# Patient Record
Sex: FEMALE | Race: BLACK OR AFRICAN AMERICAN | NOT HISPANIC OR LATINO | URBAN - METROPOLITAN AREA
[De-identification: names, ages, dates, MRNs, and addresses within clinical notes are randomized per-mention and may not be internally consistent; named-entity substitution may affect disease eponyms.]

---

## 2017-01-13 ENCOUNTER — COMMUNICATION - HEALTHEAST (OUTPATIENT)
Dept: HEALTH INFORMATION MANAGEMENT | Facility: CLINIC | Age: 27
End: 2017-01-13

## 2018-04-03 ENCOUNTER — OFFICE VISIT - HEALTHEAST (OUTPATIENT)
Dept: FAMILY MEDICINE | Facility: CLINIC | Age: 28
End: 2018-04-03

## 2018-04-03 DIAGNOSIS — Z00.00 ROUTINE ADULT HEALTH MAINTENANCE: ICD-10-CM

## 2018-04-03 DIAGNOSIS — N92.0 EXCESSIVE OR FREQUENT MENSTRUATION: ICD-10-CM

## 2018-04-03 ASSESSMENT — MIFFLIN-ST. JEOR: SCORE: 1376.33

## 2018-04-04 LAB
C TRACH DNA SPEC QL PROBE+SIG AMP: NEGATIVE
N GONORRHOEA DNA SPEC QL NAA+PROBE: NEGATIVE

## 2018-04-06 ENCOUNTER — COMMUNICATION - HEALTHEAST (OUTPATIENT)
Dept: FAMILY MEDICINE | Facility: CLINIC | Age: 28
End: 2018-04-06

## 2018-04-06 LAB
BKR LAB AP ABNORMAL BLEEDING: YES
BKR LAB AP BIRTH CONTROL/HORMONES: ABNORMAL
BKR LAB AP CERVICAL APPEARANCE: NORMAL
BKR LAB AP GYN ADEQUACY: ABNORMAL
BKR LAB AP GYN INTERPRETATION: ABNORMAL
BKR LAB AP HPV REFLEX: ABNORMAL
BKR LAB AP LMP: ABNORMAL
BKR LAB AP PATIENT STATUS: ABNORMAL
BKR LAB AP PREVIOUS ABNORMAL: ABNORMAL
BKR LAB AP PREVIOUS NORMAL: 2015
HIGH RISK?: NO
PATH REPORT.COMMENTS IMP SPEC: ABNORMAL
RESULT FLAG (HE HISTORICAL CONVERSION): ABNORMAL

## 2018-04-10 ENCOUNTER — HOSPITAL ENCOUNTER (OUTPATIENT)
Dept: ULTRASOUND IMAGING | Facility: HOSPITAL | Age: 28
Discharge: HOME | End: 2018-04-10
Attending: FAMILY MEDICINE

## 2018-04-10 ENCOUNTER — AMBULATORY - HEALTHEAST (OUTPATIENT)
Dept: FAMILY MEDICINE | Facility: CLINIC | Age: 28
End: 2018-04-10

## 2018-04-10 ENCOUNTER — COMMUNICATION - HEALTHEAST (OUTPATIENT)
Dept: FAMILY MEDICINE | Facility: CLINIC | Age: 28
End: 2018-04-10

## 2018-04-10 DIAGNOSIS — N92.0 EXCESSIVE OR FREQUENT MENSTRUATION: ICD-10-CM

## 2018-04-10 DIAGNOSIS — N83.202 LEFT OVARIAN CYST: ICD-10-CM

## 2018-04-13 ENCOUNTER — COMMUNICATION - HEALTHEAST (OUTPATIENT)
Dept: FAMILY MEDICINE | Facility: CLINIC | Age: 28
End: 2018-04-13

## 2018-05-03 ENCOUNTER — OFFICE VISIT - HEALTHEAST (OUTPATIENT)
Dept: FAMILY MEDICINE | Facility: CLINIC | Age: 28
End: 2018-05-03

## 2018-05-03 DIAGNOSIS — M54.50 ACUTE BILATERAL LOW BACK PAIN WITHOUT SCIATICA: ICD-10-CM

## 2018-05-03 DIAGNOSIS — R87.619 ATYPICAL ENDOCERVICAL CELLS ON PAP SMEAR: ICD-10-CM

## 2018-05-03 DIAGNOSIS — R10.30 LOWER ABDOMINAL PAIN: ICD-10-CM

## 2018-05-03 DIAGNOSIS — N92.0 SPOTTING: ICD-10-CM

## 2018-05-03 ASSESSMENT — MIFFLIN-ST. JEOR: SCORE: 1376.33

## 2018-06-07 ENCOUNTER — AMBULATORY - HEALTHEAST (OUTPATIENT)
Dept: FAMILY MEDICINE | Facility: CLINIC | Age: 28
End: 2018-06-07

## 2018-06-07 DIAGNOSIS — R07.89 ATYPICAL CHEST PAIN: ICD-10-CM

## 2018-06-07 DIAGNOSIS — R87.619 ABNORMAL PAP SMEAR OF CERVIX: ICD-10-CM

## 2018-06-07 LAB
HCG UR QL: NEGATIVE
SP GR UR STRIP: 1.02 (ref 1–1.03)

## 2018-06-11 LAB
LAB AP CHARGES (HE HISTORICAL CONVERSION): NORMAL
PATH REPORT.COMMENTS IMP SPEC: NORMAL
PATH REPORT.COMMENTS IMP SPEC: NORMAL
PATH REPORT.FINAL DX SPEC: NORMAL
PATH REPORT.GROSS SPEC: NORMAL
PATH REPORT.MICROSCOPIC SPEC OTHER STN: NORMAL
PATH REPORT.RELEVANT HX SPEC: NORMAL
RESULT FLAG (HE HISTORICAL CONVERSION): NORMAL

## 2018-06-14 ENCOUNTER — COMMUNICATION - HEALTHEAST (OUTPATIENT)
Dept: SCHEDULING | Facility: CLINIC | Age: 28
End: 2018-06-14

## 2018-06-15 ENCOUNTER — OFFICE VISIT - HEALTHEAST (OUTPATIENT)
Dept: FAMILY MEDICINE | Facility: CLINIC | Age: 28
End: 2018-06-15

## 2018-06-15 DIAGNOSIS — M94.0 COSTOCHONDRITIS: ICD-10-CM

## 2018-06-15 DIAGNOSIS — R07.89 CHEST WALL PAIN: ICD-10-CM

## 2018-07-23 ENCOUNTER — OFFICE VISIT - HEALTHEAST (OUTPATIENT)
Dept: FAMILY MEDICINE | Facility: CLINIC | Age: 28
End: 2018-07-23

## 2018-07-23 DIAGNOSIS — L91.8 SKIN TAG: ICD-10-CM

## 2018-07-23 DIAGNOSIS — N92.0 EXCESSIVE OR FREQUENT MENSTRUATION: ICD-10-CM

## 2018-08-02 ENCOUNTER — AMBULATORY - HEALTHEAST (OUTPATIENT)
Dept: FAMILY MEDICINE | Facility: CLINIC | Age: 28
End: 2018-08-02

## 2018-08-02 DIAGNOSIS — Z30.09 BIRTH CONTROL COUNSELING: ICD-10-CM

## 2018-08-02 DIAGNOSIS — Z30.46 ENCOUNTER FOR NEXPLANON REMOVAL: ICD-10-CM

## 2018-10-18 ENCOUNTER — AMBULATORY - HEALTHEAST (OUTPATIENT)
Dept: NURSING | Facility: CLINIC | Age: 28
End: 2018-10-18

## 2019-01-18 ENCOUNTER — AMBULATORY - HEALTHEAST (OUTPATIENT)
Dept: NURSING | Facility: CLINIC | Age: 29
End: 2019-01-18

## 2019-01-18 DIAGNOSIS — Z00.00 HEALTHCARE MAINTENANCE: ICD-10-CM

## 2019-04-12 ENCOUNTER — AMBULATORY - HEALTHEAST (OUTPATIENT)
Dept: NURSING | Facility: CLINIC | Age: 29
End: 2019-04-12

## 2019-07-11 ENCOUNTER — COMMUNICATION - HEALTHEAST (OUTPATIENT)
Dept: FAMILY MEDICINE | Facility: CLINIC | Age: 29
End: 2019-07-11

## 2019-07-11 ENCOUNTER — AMBULATORY - HEALTHEAST (OUTPATIENT)
Dept: FAMILY MEDICINE | Facility: CLINIC | Age: 29
End: 2019-07-11

## 2019-07-11 ENCOUNTER — AMBULATORY - HEALTHEAST (OUTPATIENT)
Dept: NURSING | Facility: CLINIC | Age: 29
End: 2019-07-11

## 2019-09-23 ENCOUNTER — OFFICE VISIT - HEALTHEAST (OUTPATIENT)
Dept: FAMILY MEDICINE | Facility: CLINIC | Age: 29
End: 2019-09-23

## 2019-09-23 DIAGNOSIS — F43.22 ADJUSTMENT DISORDER WITH ANXIOUS MOOD: ICD-10-CM

## 2019-09-23 LAB
ERYTHROCYTE [DISTWIDTH] IN BLOOD BY AUTOMATED COUNT: 11.6 % (ref 11–14.5)
HCT VFR BLD AUTO: 38.4 % (ref 35–47)
HGB BLD-MCNC: 13.2 G/DL (ref 12–16)
MCH RBC QN AUTO: 29.4 PG (ref 27–34)
MCHC RBC AUTO-ENTMCNC: 34.2 G/DL (ref 32–36)
MCV RBC AUTO: 86 FL (ref 80–100)
PLATELET # BLD AUTO: 277 THOU/UL (ref 140–440)
PMV BLD AUTO: 7.7 FL (ref 7–10)
RBC # BLD AUTO: 4.47 MILL/UL (ref 3.8–5.4)
TSH SERPL DL<=0.005 MIU/L-ACNC: 1.28 UIU/ML (ref 0.3–5)
WBC: 4.3 THOU/UL (ref 4–11)

## 2019-09-23 RX ORDER — CITALOPRAM HYDROBROMIDE 10 MG/1
10 TABLET ORAL DAILY
Qty: 30 TABLET | Refills: 2 | Status: SHIPPED | OUTPATIENT
Start: 2019-09-23

## 2019-09-23 RX ORDER — ALPRAZOLAM 0.25 MG
TABLET ORAL
Qty: 15 TABLET | Refills: 0 | Status: SHIPPED | OUTPATIENT
Start: 2019-09-23

## 2019-10-03 ENCOUNTER — COMMUNICATION - HEALTHEAST (OUTPATIENT)
Dept: FAMILY MEDICINE | Facility: CLINIC | Age: 29
End: 2019-10-03

## 2019-10-03 DIAGNOSIS — Z30.09 BIRTH CONTROL COUNSELING: ICD-10-CM

## 2020-11-10 ENCOUNTER — COMMUNICATION - HEALTHEAST (OUTPATIENT)
Dept: SCHEDULING | Facility: CLINIC | Age: 30
End: 2020-11-10

## 2020-11-11 ENCOUNTER — OFFICE VISIT - HEALTHEAST (OUTPATIENT)
Dept: FAMILY MEDICINE | Facility: CLINIC | Age: 30
End: 2020-11-11

## 2020-11-11 DIAGNOSIS — U07.1 2019 NOVEL CORONAVIRUS DISEASE (COVID-19): ICD-10-CM

## 2021-06-01 VITALS — WEIGHT: 158.3 LBS | BODY MASS INDEX: 28.95 KG/M2

## 2021-06-01 VITALS — HEIGHT: 62 IN | WEIGHT: 155 LBS | BODY MASS INDEX: 28.52 KG/M2

## 2021-06-01 VITALS — WEIGHT: 155 LBS | HEIGHT: 62 IN | BODY MASS INDEX: 28.52 KG/M2

## 2021-06-01 VITALS — WEIGHT: 159 LBS | BODY MASS INDEX: 29.08 KG/M2

## 2021-06-01 VITALS — BODY MASS INDEX: 28.84 KG/M2 | WEIGHT: 157.7 LBS

## 2021-06-01 VITALS — BODY MASS INDEX: 28.9 KG/M2 | WEIGHT: 158 LBS

## 2021-06-01 NOTE — PROGRESS NOTES
ASSESSMENT/PLAN:  1. Adjustment disorder with anxious mood  28 yo female with adjustment disorder and anxiety due to relationship stress.  She is started on citalopram. We discussed side effects of citalopram.  Given alprazolam to be taken sparingly for symptoms of more severe anxiety.  She is referred for individual counseling.  Follow up in 3 weeks  - citalopram (CELEXA) 10 MG tablet; Take 1 tablet (10 mg total) by mouth daily.  Dispense: 30 tablet; Refill: 2  - ALPRAZolam (XANAX) 0.25 MG tablet; Take 1 tab po daily prn as needed for anxiet  Dispense: 15 tablet; Refill: 0  - HM2(CBC w/o Differential)  - Thyroid Archuleta  - Ambulatory referral to Psychology      Dragon dictation was used for this note.  Speech recognition errors are a possibility.    No follow-ups on file.  There are no Patient Instructions on file for this visit.    Orders Placed This Encounter   Procedures     Influenza,Seasonal,Quad,INJ =/>6months     HM2(CBC w/o Differential)     Thyroid Archuleta     Ambulatory referral to Psychology     Referral Priority:   Routine     Referral Type:   Behavioral Health     Referral Reason:   Evaluation and Treatment     Requested Specialty:   Psychology     Number of Visits Requested:   1     There are no discontinued medications.      CHIEF COMPLAINT;  Chief Complaint   Patient presents with     Stress       HISTORY OF PRESENT ILLNESS:  Serafin is a 29 y.o. female presenting to the clinic today for concerns for anxiety and stress. She and her significant other Tolbert are have difficulties. This very hard for her. She is tearful daily, it is affecting her sleep and she is very anxious throughout the day. 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 She does not drink alcohol and is a nonsmoker. No history of similar symptoms or treatment for depression.  No thoughts of hurting herself.    Remainder of 12-point ROS is negative.    TOBACCO USE:  Social History     Tobacco Use   Smoking Status  Never Smoker   Smokeless Tobacco Never Used       VITALS:  Vitals:    09/23/19 1034   BP: 100/70   Patient Site: Left Arm   Patient Position: Sitting   Cuff Size: Adult Regular   Pulse: 66   SpO2: 99%   Weight: 154 lb 14.4 oz (70.3 kg)     Wt Readings from Last 3 Encounters:   09/23/19 154 lb 14.4 oz (70.3 kg)   08/02/18 158 lb 4.8 oz (71.8 kg)   07/23/18 159 lb (72.1 kg)     Body mass index is 28.33 kg/m .    PHYSICAL EXAM:  GENERAL APPEARANCE: Alert, cooperative, no distress, appears stated age  She is tearful but has appropriate affect and is well groomed  RECENT RESULTS  No results found for this or any previous visit (from the past 48 hour(s)).    MEDICATIONS:  Current Outpatient Medications   Medication Sig Dispense Refill     ALPRAZolam (XANAX) 0.25 MG tablet Take 1 tab po daily prn as needed for anxiet 15 tablet 0     citalopram (CELEXA) 10 MG tablet Take 1 tablet (10 mg total) by mouth daily. 30 tablet 2     No current facility-administered medications for this visit.

## 2021-06-03 VITALS
OXYGEN SATURATION: 99 % | DIASTOLIC BLOOD PRESSURE: 70 MMHG | WEIGHT: 154.9 LBS | HEART RATE: 66 BPM | BODY MASS INDEX: 28.33 KG/M2 | SYSTOLIC BLOOD PRESSURE: 100 MMHG

## 2021-06-13 NOTE — PROGRESS NOTES
"Serafin Ramos is a 30 y.o. female who is being evaluated via a billable telephone visit.      The patient has been notified of following:     \"This telephone visit will be conducted via a call between you and your physician/provider. We have found that certain health care needs can be provided without the need for a physical exam.  This service lets us provide the care you need with a short phone conversation.  If a prescription is necessary we can send it directly to your pharmacy.  If lab work is needed we can place an order for that and you can then stop by our lab to have the test done at a later time.    Telephone visits are billed at different rates depending on your insurance coverage. During this emergency period, for some insurers they may be billed the same as an in-person visit.  Please reach out to your insurance provider with any questions.    If during the course of the call the physician/provider feels a telephone visit is not appropriate, you will not be charged for this service.\"    Patient has given verbal consent to a Telephone visit? Yes    What phone number would you like to be contacted at? [phone]    Patient would like to receive their AVS by AVS Preference: Yvonne.        Assessment/Plan:     1. 2019 novel coronavirus disease (COVID-19)         Diagnoses and all orders for this visit:    2019 novel coronavirus disease (COVID-19)    Discussed with patient that retesting is not necessary.  She is having symptoms consistent with COVID-19 infection including cough, change in taste, headache, body aches,  fatigue as well as subjective fever and chills.  She had a positive COVID-19 test at work.  At this point, recommend that she self isolate per CDC guidelines for 10 days after onset of symptoms and until she has been fever free for 24 hours and has had improvement in symptoms.  Discussed that treatment is symptomatic.  Encouraged rest and fluids.  She should contact clinic if she develops " difficulty breathing, shortness of breath or new concerning symptoms.             Subjective:      Serafin Ramos is a 30 y.o. female who is evaluated today via telephone encounter to discuss testing for Covid.  She works at the [employer].  She reports that she had a Covid test performed yesterday.  States that they were testing all employees.  She is not sure if there was an exposure.  She reports her test was positive.  She was advised to discuss with her primary clinic whether she should be retested.  Patient reports that she is having symptoms consistent with COVID-19.  States that she has had a cough since , which is about 4 days.  She has not had a rash.  No rhinorrhea.  No sore throat.  She has had headache.  She has felt warm and cold at times.  States things taste bitter in her mouth when she eats.  She has had some body aches and fatigue.  She has had some nausea but no diarrhea.  Appetite has been normal.  She has not had shortness of breath.  We reviewed and updated her medications.  Review of systems is otherwise negative.  No other questions today.    Current Outpatient Medications   Medication Sig Dispense Refill     ALPRAZolam (XANAX) 0.25 MG tablet Take 1 tab po daily prn as needed for anxiet 15 tablet 0     citalopram (CELEXA) 10 MG tablet Take 1 tablet (10 mg total) by mouth daily. 30 tablet 2     Current Facility-Administered Medications   Medication Dose Route Frequency Provider Last Rate Last Dose     medroxyPROGESTERone injection 150 mg (DEPO-PROVERA)  150 mg Intramuscular Q3 Months Lissette Momin MD           Past Medical History, Family History, and Social History reviewed.  Past Medical History:   Diagnosis Date     Nexplanon insertion 2016    OUt 2019     Past Surgical History:   Procedure Laterality Date      SECTION, LOW TRANSVERSE       Patient has no known allergies.  Family History   Problem Relation Age of Onset     Breast cancer Neg Hx      Clotting  disorder Neg Hx      Stroke Neg Hx      Social History     Socioeconomic History     Marital status: Single     Spouse name: Not on file     Number of children: Not on file     Years of education: Not on file     Highest education level: Not on file   Occupational History     Not on file   Social Needs     Financial resource strain: Not on file     Food insecurity     Worry: Not on file     Inability: Not on file     Transportation needs     Medical: Not on file     Non-medical: Not on file   Tobacco Use     Smoking status: Never Smoker     Smokeless tobacco: Never Used   Substance and Sexual Activity     Alcohol use: No     Drug use: No     Sexual activity: Not on file   Lifestyle     Physical activity     Days per week: Not on file     Minutes per session: Not on file     Stress: Not on file   Relationships     Social connections     Talks on phone: Not on file     Gets together: Not on file     Attends Denominational service: Not on file     Active member of club or organization: Not on file     Attends meetings of clubs or organizations: Not on file     Relationship status: Not on file     Intimate partner violence     Fear of current or ex partner: Not on file     Emotionally abused: Not on file     Physically abused: Not on file     Forced sexual activity: Not on file   Other Topics Concern     Not on file   Social History Narrative     Not on file         Review of systems is as stated in HPI, and the remainder of the 10 system review is otherwise negative.    Objective:     There were no vitals filed for this visit. There is no height or weight on file to calculate BMI.    Exam: she is alert. She is speaking clearly. She does not sound short of breath    This note has been dictated using voice recognition software. Any grammatical or context distortions are unintentional and inherent to the the software.             Phone call duration:  7 minutes    Loni Parra MD

## 2021-06-13 NOTE — TELEPHONE ENCOUNTER
Patient states she was just tested for Covid 19 at her employer today and results were positive and employer told her to be re-tested through provider.  Patient has no symptoms.  Transferred to scheduling for Virtual Visit.    Laquita Beasley RN  [facility] Triage Nurse Advisor

## 2021-06-16 PROBLEM — N83.202 LEFT OVARIAN CYST: Status: ACTIVE | Noted: 2018-04-10

## 2021-06-17 NOTE — PROGRESS NOTES
ASSESSMENT/PLAN:  1. Routine adult health maintenance  Healthy 26 yo female.  Pap smear updated.  Encouraged healthy eating and exercise.  - Gynecologic Cytology (PAP Smear)    2. Menorrhagia  Nexplanon in place, bleeding likely a side effect- ultrasound for evaluation and consider adding OCP to stabilize bleeding  - Chlamydia trachomatis & Neisseria gonorrhoeae, Amplified Detection  - US Pelvis With Transvaginal Non OB; Future      General Health Maintenance  Her PAP smear was completed today.    There are no Patient Instructions on file for this visit.    Orders Placed This Encounter   Procedures     Chlamydia trachomatis & Neisseria gonorrhoeae, Amplified Detection     Order Specific Question:   Specimen Source?     Answer:   Endocervix     US Pelvis With Transvaginal Non OB     Standing Status:   Future     Standing Expiration Date:   4/3/2019     Order Specific Question:   Reason for Exam (Describe Symptoms):     Answer:   menorrhagia, right adenexa fullness and tenderness     Order Specific Question:   Is the patient pregnant?     Answer:   No     Order Specific Question:   Can the procedure be changed per Radiologist protocol?     Answer:   Yes     There are no discontinued medications.    No Follow-up on file.    CHIEF COMPLAINT:  Chief Complaint   Patient presents with     Annual Exam       HISTORY OF PRESENT ILLNESS:  Serafin is a 27 y.o. female presenting to the clinic today for an annual exam.    Menorrhagia: She had the nexplanon implanted in clinic 7/28/16. Since then, she has been experiencing heavy bleeding and abdominal pain. The heavy bleeding will last for 4-5 days. Her abdominal pain started about a month ago. She feels her symptoms are consistent with her Nexplanon. She has previously received Depo Provera injections and did not experience adverse side effects. She does not want an IUD. She would go back to receiving Depo Provera shots, but after her current Nexplanon expires. She would like to  "take oral contraceptive in conjunction with the Nexplanon.     REVIEW OF SYSTEMS:   All other systems are negative.    PFSH:  She denies a family history of breast cancer, blood clots, or early strokes. Reviewed, as below.    History   Smoking Status     Never Smoker   Smokeless Tobacco     Never Used       Family History   Problem Relation Age of Onset     Breast cancer Neg Hx      Clotting disorder Neg Hx      Stroke Neg Hx        Social History     Social History     Marital status: Single     Spouse name: N/A     Number of children: N/A     Years of education: N/A     Occupational History     Not on file.     Social History Main Topics     Smoking status: Never Smoker     Smokeless tobacco: Never Used     Alcohol use No     Drug use: No     Sexual activity: Not on file     Other Topics Concern     Not on file     Social History Narrative       Past Surgical History:   Procedure Laterality Date      SECTION, LOW TRANSVERSE         No Known Allergies    Active Ambulatory Problems     Diagnosis Date Noted     Menorrhagia      Resolved Ambulatory Problems     Diagnosis Date Noted     Vaginal Discharge      Pelvic Pain      Pain During Urination (Dysuria)      Constipation      Amenorrhea      Abdominal pain      Pregnancy Complicated By Benign Essential Hypertension - Delivered With A Postpartum Complication      Rhythm Disorder      Pregnancy      Pregnant 10/02/2015     , delivered 10/03/2015     Past Medical History:   Diagnosis Date     Nexplanon insertion 2016       VITALS:  Vitals:    18 1353   BP: 100/64   Pulse: 62   Resp: 18   Weight: 155 lb (70.3 kg)   Height: 5' 2\" (1.575 m)     Wt Readings from Last 3 Encounters:   18 155 lb (70.3 kg)   10/15/17 157 lb (71.2 kg)   16 156 lb (70.8 kg)     Body mass index is 28.35 kg/(m^2).    PHYSICAL EXAM:  General Appearance: Alert, pleasant, appears stated age  Head: Normocephalic, without obvious abnormality  Eyes: PERRL, " conjunctiva/corneas clear, EOM's intact  Ears: Normal TM's and external ear canals, both ears  Nose: Nares normal, septum midline,mucosa normal, no drainage  Throat: Lips, mucosa, and tongue normal; teeth and gums normal; oropharynx is clear  Neck: Supple,without lymphadenopathy or thyromegaly  Lungs: Clear to auscultation bilaterally, respirations unlabored  Breasts: No palpable masses, tenderness, asymmetry, or nipple discharge. No axillary or supraclavicular lymphadenopathy  Heart: Regular rate and rhythm, no murmur   Abdomen: Soft, non-tender, no masses, no organomegaly  Pelvic: Normally developed genitalia with no external lesions or eruptions. Vagina and cervix show no lesions. No cystocele, No rectocele. Uterus normal.  Cervical motion tenderness. Adnexal fullness and tenderness.  Extremities: Extremities with strong and symmetric pulses, no cyanosis or edema  Skin: Skin color, texture normal, no rashes or lesions  Neuro: Normal    RECENT RESULTS  No results found for this or any previous visit (from the past 48 hour(s)).    ADDITIONAL HISTORY SUMMARIZED (2): None.  DECISION TO OBTAIN EXTRA INFORMATION (1): None.   RADIOLOGY TESTS (1): None.  LABS (1): Labs ordered.  MEDICINE TESTS (1): None.  INDEPENDENT REVIEW (2 each): None.     The visit lasted a total of 17 minutes face to face with the patient. Over 50% of the time was spent counseling and educating the patient about abdominal pain and heavy bleeding and routine health maintenance.    IRossi, am scribing for and in the presence of, Dr. Momin.    I, Dr. Momin personally performed the services described in this documentation, as scribed by Rossi Melendez in my presence, and it is both accurate and complete.    MEDICATIONS:  Current Outpatient Prescriptions   Medication Sig Dispense Refill     etonogestrel (NEXPLANON) 68 mg Impl implant Implant placed 7-28-16 1 each 0     PRENATAL VIT #91/FE FUM/FA/DHA (PRENATAL + DHA ORAL) Take 1 capsule by  mouth daily. Prenatal Multi +DHA 27-0.8-228 MG       Current Facility-Administered Medications   Medication Dose Route Frequency Provider Last Rate Last Dose     medroxyPROGESTERone injection 150 mg (DEPO-PROVERA)  150 mg Intramuscular Q3 Months Lissette Momin MD   150 mg at 04/26/16 1150       Total data points: 1

## 2021-06-17 NOTE — PROGRESS NOTES
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  We discussed using heat and ibuprofen to help with the pain.  Patient also complains of lower abdominal pain.  It is difficult to elicit symptoms from the patient.  Patient believes the lower abdominal pain is likely related to her spotting.  Patient spotting has several possible etiologies.  She does have a Nexplanon in place which is known to cause spotting, she also has LSIL on her most recent Pap.  Spent most of the appointment explaining the Pap smear results which patient had had previously explained to her, but did not appear to appreciate the need for additional follow-up.  Explained the procedure of the colposcopy, and escorted patient to the  to schedule as quickly as possible.  Although very little workup was performed on patient's spotting, abdominal pain, or even the back pain; I felt it is more important to get the patient scheduled for a known needed procedure, help her understand the importance of this, and to see if some conservative measures could help with the unspecified pains.    Problem List Items Addressed This Visit     None      Visit Diagnoses     Atypical endocervical cells on Pap smear    -  Primary    Spotting        Acute bilateral low back pain without sciatica        Lower abdominal pain              Return to clinic as soon as possible for colposcopy.    Total time spent with patient was 15 minutes with greater than 50% spent in face-to-face counseling regarding the above plan.    Subjective:      Serafin Ramos is a 27 y.o. female who presents to clinic for back pain and spotting.  Patient was seen approximately 1 month ago for a routine healthcare maintenance appointment.  Her Pap smear at that time was LSIL and will require a colposcopy. GC chlamydia was negative, though.  She was also to obtain a  "pelvic ultrasound secondary to menorrhagia which demonstrated a left ovarian cyst with follow-up necessary within 6-12 weeks.  Patient does have a Nexplanon in place.    Patient complains of her \"stomach\" hurting for the past several weeks.  She complains of both abdominal and back pain.  She denies dysuria, dyspareunia, and states that the abdominal pain feels like period cramps.  She is currently spotting.  It is unclear if she is menstruating.  Patient also works as a home health nurse.  She believes that her back pain is likely secondary to lifting patients.  One time she tried extra strength Tylenol with minimal effect.  Otherwise she has done no treatment for either the abdominal pain, the spotting, or the back pain.    Past Medical History, Family History, and Social History reviewed.     Current Outpatient Prescriptions on File Prior to Visit   Medication Sig Dispense Refill     etonogestrel (NEXPLANON) 68 mg Impl implant Implant placed 7-28-16 1 each 0     PRENATAL VIT #91/FE FUM/FA/DHA (PRENATAL + DHA ORAL) Take 1 capsule by mouth daily. Prenatal Multi +DHA 27-0.8-228 MG       Current Facility-Administered Medications on File Prior to Visit   Medication Dose Route Frequency Provider Last Rate Last Dose     medroxyPROGESTERone injection 150 mg (DEPO-PROVERA)  150 mg Intramuscular Q3 Months Lissette Momin MD   150 mg at 04/26/16 1150       Review of systems is as stated in HPI.  Endorses: back pain  The remainder of the 10 system review is otherwise negative.    Objective:     /62  Pulse 86  Ht 5' 2\" (1.575 m)  Wt 155 lb (70.3 kg)  SpO2 98%  BMI 28.35 kg/m2 Body mass index is 28.35 kg/(m^2).    Gen: Alert, NAD, appears stated age, normal hygiene   MSK: grossly full range of motion in all joints, no obvious deformity; some mild tenderness to deep palpation in the bilateral lower back and a large swath      This note has been dictated using voice recognition software. Any grammatical or context " distortions are unintentional and inherent to the the software.     Li Kaur MD

## 2021-06-18 NOTE — PROGRESS NOTES
Chief complaint: Chest pain    HPI: Patient was worked urgently into my schedule because of persistent chest pain.    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  She mentioned it as an aside to her physician when she was undergoing a colposcopy on June 7.  The provider suggested that she use over-the-counter Zantac, have fasting lipids drawn and return if things were not getting better within about a week.    The patient said that the Zantac made no difference at all.  She reports that her chest pain is in the middle of her chest that is worse when she is raising her arms over her head or if she is lifting a client.  She also has a 2-year-old child and a 4-year-old child at home whom she is lifting into and out of car seats.  She is not having the chest pain with activity and she has no associated diaphoresis nausea or radiation of the pain.    Objective:/60  Pulse 84  Wt 157 lb 11.2 oz (71.5 kg)  LMP 06/07/2018  BMI 28.84 kg/m2  This -American woman is in no acute distress.  I reviewed the notes from the encounter on June 7 and I reviewed the triage notes from last night.  She has not yet had the fasting lipids drawn which were ordered week ago.  Her conjunctiva are clear her neck is supple her lungs are clear to auscultation and breathing does not make a difference in her chest wall pain.  Her cardiac exam is a regular rate rhythm S1-S2 and her vitals of course are normal.  She does have tenderness to palpation along the chest wall particularly at the costochondral junction.  This reproduces her pain.    Assessment: Chest wall pain    Plan: I suggested that she get her fasting lipids done just to help stratify her risk but with no family history of heart disease, 28-year-old woman is not very likely to have true cardiac disease causing this type of pain.  I have suggested that she do 400-600 mg of ibuprofen over-the-counter 3 times a  day with food.  I offered to send her to some physical therapy and since she did not know her insurance coverage would be she opted to do just anti-inflammatories first.  We will schedule her to have a follow-up appointment to make sure that she can follow-up with this in case the anti-inflammatories do not work.    She was instructed that if she gets chest pain and is accompanied by pressure, breathing difficulties, sweats or nausea, she needs to call 911 and be seen at the emergency department right away.  She was comfortable with that plan and will follow-up as scheduled

## 2021-06-18 NOTE — PROGRESS NOTES
Colposcopy  Date/Time: 6/7/2018 1:33 PM  Performed by: NICK KEMP  Authorized by: NICK KEMP   Consent: Verbal consent obtained.  Risks and benefits: risks, benefits and alternatives were discussed  Consent given by: patient  Patient understanding: patient states understanding of the procedure being performed  Patient consent: the patient's understanding of the procedure matches consent given  Procedure consent: procedure consent matches procedure scheduled  Test results: test results available and properly labeled  Required items: required blood products, implants, devices, and special equipment available  Patient identity confirmed: verbally with patient  Preparation: Patient was prepped and draped in the usual sterile fashion.  Local anesthesia used: no    Anesthesia:  Local anesthesia used: no    Sedation:  Patient sedated: no  Patient tolerance: Patient tolerated the procedure well with no immediate complications      Acetowhite changes at 11 o'clock, 1 o'clock, and 5 o'clock. Biopsies taken at 1 o'clock and 5 o'clock. Minimal amount of ECC done due to patient discomfort.    ADDITIONAL HISTORY SUMMARIZED (2): None.  DECISION TO OBTAIN EXTRA INFORMATION (1): None.   RADIOLOGY TESTS (1): None.  LABS (1): Labs ordered. UPT negative. Pap 4/3/18 reviewed, LSIL.  MEDICINE TESTS (1): None.  INDEPENDENT REVIEW (2 each): None.     The visit lasted a total of 19 minutes face to face with the patient. Over 50% of the time was spent counseling and educating the patient about colposcopy.    IRossi, am scribing for and in the presence of, Dr. Kemp.    I, Dr. Kemp, personally performed the services described in this documentation, as scribed by Rossi Melendez in my presence, and it is both accurate and complete.    Total Data Points: 1

## 2021-06-18 NOTE — PROGRESS NOTES
ASSESSMENT/PLAN:  1. Abnormal Pap smear of cervix  Abnormal Pap smear noted to be LGSIL.  Cervix is consistent with mild dysplasia.  Biopsies are sent for pathology review.  Encouraged her to continue to avoid tobacco use.  Advised to return for reevaluation with any significant bleeding or pain.  Further recommendations pending path report.  - Pregnancy (Beta-hCG, Qual), Urine  - Colposcopy  - Surgical Pathology Exam    2. Atypical chest pain  Atypical left-sided chest pain.  She has no risk factors for heart disease.  Certainly could be more GI related so we do a trial of Zantac twice daily.  Will check fasting lipid cascade.  I do let her know that if the symptoms are not improving within the next week or any point that they are worsening that she does need to be seen again for reevaluation.  - Lipid Blanco FASTING; Future      Patient Instructions   Take ibuprofen for the next 24 hours.  Take it easy for the next 24 hours.  I will contact you as soon as I get the biopsy results back.     Get a package of Prilosec (omeprazole) over the counter. Take this every day.   - If your symptoms do not resolve or worsen, call me right away.     Make a Lab Only appointment for cholesterol levels.       Orders Placed This Encounter   Procedures     Colposcopy     This order was created via procedure documentation     Pregnancy (Beta-hCG, Qual), Urine     Lipid Cascade FASTING     Standing Status:   Future     Standing Expiration Date:   6/7/2019     Order Specific Question:   Fasting is required?     Answer:   Yes     There are no discontinued medications.    No Follow-up on file.    CHIEF COMPLAINT;  Chief Complaint   Patient presents with     Procedure     colposcopy       HISTORY OF PRESENT ILLNESS:  Serafin is a 28 y.o. female presenting to the clinic today for chest pain. She endorses chest pain that has been present for about one month. She mostly notices this while walking at work. This does occasionally happen at  home. The pain does not radiate to her jaw or arm. It does not happen every day and it typically lasts about 5 minutes. She is unsure if it is occurs after food or worsened with lying flat. She has never had similar symptoms. She denies a family history of heart problems or blood clots. She has not started taking any new vitamins or supplements. She denies lower extremity edema or exacerbation of chest pain with deep breaths.    REVIEW OF SYSTEMS:  All other systems are negative.    PFSH:  Reviewed, as below.    TOBACCO USE:  History   Smoking Status     Never Smoker   Smokeless Tobacco     Never Used       VITALS:  Vitals:    06/07/18 1325   BP: 100/60   Resp: 16   Weight: 158 lb (71.7 kg)     Wt Readings from Last 3 Encounters:   06/07/18 158 lb (71.7 kg)   05/03/18 155 lb (70.3 kg)   04/03/18 155 lb (70.3 kg)     Body mass index is 28.9 kg/(m^2).    PHYSICAL EXAM:  GENERAL APPEARANCE: Alert, cooperative, no distress, appears stated age  HEAD: Normocephalic, without obvious abnormality, atraumatic  LUNGS: Clear to auscultation bilaterally, respirations unlabored  CHEST WALL: No tenderness or deformity  HEART: Regular rate and rhythm, S1 and S2 normal, no murmur, rub or gallop  NEUROLOGIC: CNII-XII intact.     RECENT RESULTS  Recent Results (from the past 48 hour(s))   Pregnancy (Beta-hCG, Qual), Urine    Collection Time: 06/07/18  1:25 PM   Result Value Ref Range    Pregnancy Test, Urine Negative Negative    Specific Gravity, UA 1.020 1.001 - 1.030       ADDITIONAL HISTORY SUMMARIZED (2): None.  DECISION TO OBTAIN EXTRA INFORMATION (1): None.  RADIOLOGY TESTS (1): None.  LABS (1): Lipid profile ordered, future.  MEDICINE TESTS (1): None.  INDEPENDENT REVIEW (2 each): None.    The visit lasted a total of 6 minutes face to face with the patient. Over 50% of the time was spent counseling and educating the patient about chest pain.    Rossi HORTON, am scribing for and in the presence of, Dr. Momin.    Dr. SOL  Merrill, personally performed the services described in this documentation, as scribed by Rossi Melendez in my presence, and it is both accurate and complete.    MEDICATIONS:  Current Outpatient Prescriptions   Medication Sig Dispense Refill     etonogestrel (NEXPLANON) 68 mg Impl implant Implant placed 7-28-16 1 each 0     PRENATAL VIT #91/FE FUM/FA/DHA (PRENATAL + DHA ORAL) Take 1 capsule by mouth daily. Prenatal Multi +DHA 27-0.8-228 MG       Current Facility-Administered Medications   Medication Dose Route Frequency Provider Last Rate Last Dose     medroxyPROGESTERone injection 150 mg (DEPO-PROVERA)  150 mg Intramuscular Q3 Months Lissette Momin MD   150 mg at 04/26/16 1150       Total data points: 1

## 2021-06-19 NOTE — PROGRESS NOTES
ASSESSMENT/PLAN:  1. Menorrhagia  28-year-old with heavy bleeding 2 years into the Nexplanon.  She has bled continuously through the last month and would like it taken out.  We will get her scheduled next week to return for removal of the Nexplanon and she would like to restart Depo-Provera which worked quite well for her in the past.    2. Skin tag  Large skin tag in her left axilla.  Will remove this at the same time we remove the Nexplanon.      Patient Instructions   Return to clinic 8/2/18 at 11 AM for Nexplanon removal, skin tag removal, and Depo Provera injection.      No orders of the defined types were placed in this encounter.    There are no discontinued medications.    No Follow-up on file.    CHIEF COMPLAINT;  Chief Complaint   Patient presents with     Follow-up     chest pain in middle, discuss going back to Depo, heavy menses       HISTORY OF PRESENT ILLNESS:  Serafin is a 28 y.o. female presenting to the clinic today irregular bleeding.    Irregular Menses: She endorses heavy menstrual bleeding that has been present for three weeks. She has had the Nexplanon in place for about 2 years. This is not painful for her. When she had the Nexplanon inserted, she did not experience irregular bleeding, but she has since developed thick menstrual bleeding. She would like the Nexplanon removed and would like to start doing the Depo-Provera injections.     Chest Pain: She was experiencing some atypical chest pain in June. She was seen in clinic 6/15/18 and reports that she has not been having this chest pain since then.     REVIEW OF SYSTEMS:  Skin is positive for skin tag under left axillary region. All other systems are negative.    PFSH:  Reviewed, as below.    TOBACCO USE:  History   Smoking Status     Never Smoker   Smokeless Tobacco     Never Used       VITALS:  Vitals:    07/23/18 1615   BP: 100/70   Pulse: 64   Resp: 18   Weight: 159 lb (72.1 kg)     Wt Readings from Last 3 Encounters:   07/23/18 159 lb  (72.1 kg)   06/15/18 157 lb 11.2 oz (71.5 kg)   06/07/18 158 lb (71.7 kg)     Body mass index is 29.08 kg/(m^2).    PHYSICAL EXAM:  GENERAL APPEARANCE: Alert, cooperative, no distress, appears stated age  HEAD: Normocephalic, without obvious abnormality, atraumatic  LUNGS: Clear to auscultation bilaterally, respirations unlabored  HEART: Regular rate and rhythm, S1 and S2 normal, no murmur, rub or gallop  SKIN: Skin tag under left axillary region  NEUROLOGIC: CNII-XII intact.     RECENT RESULTS  No results found for this or any previous visit (from the past 48 hour(s)).      ADDITIONAL HISTORY SUMMARIZED (2): Estevan note 7/28/2016 reviewed, Nexplanon insertion   DECISION TO OBTAIN EXTRA INFORMATION (1): None.  RADIOLOGY TESTS (1): None.  LABS (1): None.  MEDICINE TESTS (1): None.  INDEPENDENT REVIEW (2 each): None.    The visit lasted a total of 8 minutes face to face with the patient. Over 50% of the time was spent counseling and educating the patient about irregular menses, atypical chest pain, and skin tag.    IRossi, am scribing for and in the presence of, Dr. Momin.    I, Dr. Momin, personally performed the services described in this documentation, as scribed by Rossi Melendez in my presence, and it is both accurate and complete.    Dragon dictation was used for this note.  Speech recognition errors are a possibility.    MEDICATIONS:  Current Outpatient Prescriptions   Medication Sig Dispense Refill     etonogestrel (NEXPLANON) 68 mg Impl implant Implant placed 7-28-16 1 each 0     Current Facility-Administered Medications   Medication Dose Route Frequency Provider Last Rate Last Dose     medroxyPROGESTERone injection 150 mg (DEPO-PROVERA)  150 mg Intramuscular Q3 Months Lissette Momin MD   150 mg at 04/26/16 1150       Total data points: 2

## 2021-06-19 NOTE — PROGRESS NOTES
Foreign Body Removal  Date/Time: 8/2/2018 11:05 AM  Performed by: NICK KEMP  Authorized by: NICK KEMP   Intake: left upper extremity.    Sedation:  Patient sedated: no  Patient restrained: no  Patient cooperative: yes  1 objects recovered.  Objects recovered: Nexplanon  Post-procedure assessment: foreign body removed    20-year-old who presents today with 2 requests.  We will be removing her Nexplanon and starting Depo-Provera.  She is having irregular bleeding with the Nexplanon and would like to try something different.  She is previously been successful with Depo-Provera and would like to go back to it.  She also has a skin tag, large 2cm  under her left axillary region.  It seems to be somewhat fat filled.  Does not have any concerning features and we discussed removal.  We discussed the risks of the procedure and informed consent was obtained.  Medications are reviewed  Allergies are reviewed  Procedure:  The 2 areas are cleansed using Betadine and 1% lidocaine with epinephrine.  2 cc each.  The site of the Nexplanon was identified and a 15 blade was used for the incision.  The Nexplanon device was identified and easily removed.  Minimal bleeding was controlled using pressure and Steri-Strips were applied  Large fat filled skin tag was removed using a 15 blade  Diastolic was used for hemostasis  The wound was dressed.  Wound care was discussed.    ASSESSMENT/PLAN  1. Nexplanon removed from left upper extremity without complication. Minimal bleeding. Steri strips applied. Advised to call with any signs of infection.    2. Skin tag removed from left axillary region. Minimal bleeding. Drysol used to control bleeding. Discussed care including applying Vaseline, covering the area, and changing the bandage daily.    ADDITIONAL HISTORY SUMMARIZED (2): None.  DECISION TO OBTAIN EXTRA INFORMATION (1): None.   RADIOLOGY TESTS (1): None.  LABS (1): None.  MEDICINE TESTS (1): None.  INDEPENDENT REVIEW  (2 each): None.     The visit lasted a total of 22 minutes face to face with the patient. Over 50% of the time was spent counseling and educating the patient about Nexplanon removal and skin tag removal.    I, Rossi Melendez, am scribing for and in the presence of, Dr. Momin.    I, Dr. Momin, personally performed the services described in this documentation, as scribed by Rossi Melendez in my presence, and it is both accurate and complete.    Dragon dictation was used for this note.  Speech recognition errors are a possibility.    Total Data Points: 0

## 2021-06-26 ENCOUNTER — HEALTH MAINTENANCE LETTER (OUTPATIENT)
Age: 31
End: 2021-06-26

## 2021-07-12 ENCOUNTER — IMMUNIZATION (OUTPATIENT)
Dept: NURSING | Facility: CLINIC | Age: 31
End: 2021-07-12
Payer: COMMERCIAL

## 2021-07-12 PROCEDURE — 91300 PR COVID VAC PFIZER DIL RECON 30 MCG/0.3 ML IM: CPT

## 2021-07-12 PROCEDURE — 0001A PR COVID VAC PFIZER DIL RECON 30 MCG/0.3 ML IM: CPT

## 2021-08-02 ENCOUNTER — IMMUNIZATION (OUTPATIENT)
Dept: NURSING | Facility: CLINIC | Age: 31
End: 2021-08-02
Attending: PEDIATRICS
Payer: COMMERCIAL

## 2021-08-02 PROCEDURE — 0002A PR COVID VAC PFIZER DIL RECON 30 MCG/0.3 ML IM: CPT

## 2021-08-02 PROCEDURE — 91300 PR COVID VAC PFIZER DIL RECON 30 MCG/0.3 ML IM: CPT

## 2021-10-16 ENCOUNTER — HEALTH MAINTENANCE LETTER (OUTPATIENT)
Age: 31
End: 2021-10-16

## 2022-07-23 ENCOUNTER — HEALTH MAINTENANCE LETTER (OUTPATIENT)
Age: 32
End: 2022-07-23

## 2022-10-01 ENCOUNTER — HEALTH MAINTENANCE LETTER (OUTPATIENT)
Age: 32
End: 2022-10-01

## 2023-08-06 ENCOUNTER — HEALTH MAINTENANCE LETTER (OUTPATIENT)
Age: 33
End: 2023-08-06

## 2025-01-23 ENCOUNTER — TRANSFERRED RECORDS (OUTPATIENT)
Dept: HEALTH INFORMATION MANAGEMENT | Facility: CLINIC | Age: 35
End: 2025-01-23

## 2025-01-23 LAB — PHQ9 SCORE: 5

## 2025-02-06 ENCOUNTER — TRANSFERRED RECORDS (OUTPATIENT)
Dept: HEALTH INFORMATION MANAGEMENT | Facility: CLINIC | Age: 35
End: 2025-02-06

## 2025-02-10 ENCOUNTER — MEDICAL CORRESPONDENCE (OUTPATIENT)
Dept: HEALTH INFORMATION MANAGEMENT | Facility: CLINIC | Age: 35
End: 2025-02-10